# Patient Record
Sex: FEMALE | Race: WHITE | URBAN - METROPOLITAN AREA
[De-identification: names, ages, dates, MRNs, and addresses within clinical notes are randomized per-mention and may not be internally consistent; named-entity substitution may affect disease eponyms.]

---

## 2017-04-25 ENCOUNTER — TELEPHONE (OUTPATIENT)
Dept: OPTOMETRY | Facility: CLINIC | Age: 32
End: 2017-04-25

## 2017-04-25 ENCOUNTER — OFFICE VISIT (OUTPATIENT)
Dept: OPTOMETRY | Facility: CLINIC | Age: 32
End: 2017-04-25
Payer: COMMERCIAL

## 2017-04-25 DIAGNOSIS — Z98.890 STATUS POST LASIK SURGERY: Primary | ICD-10-CM

## 2017-04-25 DIAGNOSIS — H52.203 ASTIGMATISM OF BOTH EYES: ICD-10-CM

## 2017-04-25 DIAGNOSIS — H04.123 DRY EYES: ICD-10-CM

## 2017-04-25 DIAGNOSIS — H02.88B MEIBOMIAN GLAND DYSFUNCTION (MGD) OF UPPER AND LOWER LIDS OF BOTH EYES: ICD-10-CM

## 2017-04-25 DIAGNOSIS — H02.88A MEIBOMIAN GLAND DYSFUNCTION (MGD) OF UPPER AND LOWER LIDS OF BOTH EYES: ICD-10-CM

## 2017-04-25 PROCEDURE — 92015 DETERMINE REFRACTIVE STATE: CPT | Performed by: OPTOMETRIST

## 2017-04-25 PROCEDURE — 92004 COMPRE OPH EXAM NEW PT 1/>: CPT | Performed by: OPTOMETRIST

## 2017-04-25 ASSESSMENT — REFRACTION_MANIFEST
OD_AXIS: 019
OD_AXIS: 010
OD_CYLINDER: +0.50
OS_SPHERE: -0.25
OD_CYLINDER: +0.50
OS_CYLINDER: +0.00
OS_SPHERE: +0.00
METHOD_AUTOREFRACTION: 1
OD_SPHERE: -0.50
OD_SPHERE: -0.25
OS_CYLINDER: SPHERE
OS_AXIS: 00

## 2017-04-25 ASSESSMENT — EXTERNAL EXAM - RIGHT EYE: OD_EXAM: NORMAL

## 2017-04-25 ASSESSMENT — KERATOMETRY
OD_K1POWER_DIOPTERS: 42.00
METHOD_AUTO_MANUAL: AUTOMATED
OD_AXISANGLE_DEGREES: 81
OD_AXISANGLE2_DEGREES: 171
OS_AXISANGLE2_DEGREES: 5
OS_K2POWER_DIOPTERS: 42.87
OD_K2POWER_DIOPTERS: 43.37
OS_AXISANGLE_DEGREES: 95
OS_K1POWER_DIOPTERS: 41.62

## 2017-04-25 ASSESSMENT — CUP TO DISC RATIO
OD_RATIO: 0.15
OS_RATIO: 0.15

## 2017-04-25 ASSESSMENT — TONOMETRY
OS_IOP_MMHG: 15
OD_IOP_MMHG: 15
IOP_METHOD: APPLANATION

## 2017-04-25 ASSESSMENT — CONF VISUAL FIELD
OS_NORMAL: 1
OD_NORMAL: 1

## 2017-04-25 ASSESSMENT — EXTERNAL EXAM - LEFT EYE: OS_EXAM: NORMAL

## 2017-04-25 ASSESSMENT — SLIT LAMP EXAM - LIDS: COMMENTS: MEIBOMIAN GLAND DYSFUNCTION

## 2017-04-25 ASSESSMENT — VISUAL ACUITY
METHOD: SNELLEN - LINEAR
OS_SC: 20/20
OD_SC: 20/20
OS_SC: 20/20
OD_SC: 20/20

## 2017-04-25 NOTE — MR AVS SNAPSHOT
After Visit Summary   4/25/2017    Ivett Sauer    MRN: [id number]           Patient Information     Date Of Birth          1985        Visit Information        Provider Department      4/25/2017 1:00 PM Pam Weinstein OD [facility]        Today's Diagnoses     Status post LASIK surgery    -  1    Dry eyes        Meibomian gland dysfunction (MGD) of upper and lower lids of both eyes        Astigmatism of both eyes          Care Instructions     DRY EYE TREATMENT    I recommend using artificial tears for your dry eye. There are over the counter drops that work well and may be used up to 4x daily. ( systane balance, refresh optive, soothe xp) If you need more than 4 drops daily, use a preservative free product which come in individual vials which may be used for 24 hours and discarded.     Artificial tears work best as a preventative and not as well after your eyes are starting to bother you.  It may take 4- 6 weeks of using the drops before you notice improvement.  If after that time you are still having problems schedule an appointment for an evaluation and discussion of different treatments.  Dry eyes are a chronic condition and you may have more symptoms at certain times of the year.  IF the dry eye becomes more symptomatic, return to clinic and I will start you on restasis drops to help you produce more tears     In addition:  You have some plugged oil glands in your lids that are contributory     Warm compresses once to twice daily for 5-10 minutes will keep these glands working     Omega 3 fatty acid supplements 1-2x daily will also help with inflammation          Follow-ups after your visit        Follow-up notes from your care team     Return in about 1 year (around 4/25/2018).      Who to contact     If you have questions or need follow up information about today's clinic visit or your schedule please contact [facility] directly at [phone].  Normal  "or non-critical lab and imaging results will be communicated to you by MyChart, letter or phone within 4 business days after the clinic has received the results. If you do not hear from us within 7 days, please contact the clinic through [employer] or phone. If you have a critical or abnormal lab result, we will notify you by phone as soon as possible.  Submit refill requests through [employer] or call your pharmacy and they will forward the refill request to us. Please allow 3 business days for your refill to be completed.          Additional Information About Your Visit        [employer] Information     [employer] lets you send messages to your doctor, view your test results, renew your prescriptions, schedule appointments and more. To sign up, go to [email or web address] . Click on \"Log in\" on the left side of the screen, which will take you to the Welcome page. Then click on \"Sign up Now\" on the right side of the page.     You will be asked to enter the access code listed below, as well as some personal information. Please follow the directions to create your username and password.     Your access code is: [email or web address]  Expires: 2017  1:56 PM     Your access code will  in 90 days. If you need help or a new code, please call your [facility] clinic or [phone].        Care EveryWhere ID     This is your Care EveryWhere ID. This could be used by other organizations to access your [facility] medical records  [id number]         Blood Pressure from Last 3 Encounters:   No data found for BP    Weight from Last 3 Encounters:   No data found for Wt              We Performed the Following     EYE EXAM (SIMPLE-NONBILLABLE)     REFRACTION        Primary Care Provider    None       No address on file        Thank you!     Thank you for choosing [facility]  for your care. Our goal is always to provide you with excellent care. Hearing back from our patients is one way we can continue to improve our services. " Please take a few minutes to complete the written survey that you may receive in the mail after your visit with us. Thank you!             Your Updated Medication List - Protect others around you: Learn how to safely use, store and throw away your medicines at www.disposemymeds.org.      Notice  As of 4/25/2017  1:56 PM    You have not been prescribed any medications.

## 2017-04-25 NOTE — PATIENT INSTRUCTIONS
DRY EYE TREATMENT    I recommend using artificial tears for your dry eye. There are over the counter drops that work well and may be used up to 4x daily. ( systane balance, refresh optive, soothe xp) If you need more than 4 drops daily, use a preservative free product which come in individual vials which may be used for 24 hours and discarded.     Artificial tears work best as a preventative and not as well after your eyes are starting to bother you.  It may take 4- 6 weeks of using the drops before you notice improvement.  If after that time you are still having problems schedule an appointment for an evaluation and discussion of different treatments.  Dry eyes are a chronic condition and you may have more symptoms at certain times of the year.  IF the dry eye becomes more symptomatic, return to clinic and I will start you on restasis drops to help you produce more tears     In addition:  You have some plugged oil glands in your lids that are contributory     Warm compresses once to twice daily for 5-10 minutes will keep these glands working     Omega 3 fatty acid supplements 1-2x daily will also help with inflammation

## 2017-04-25 NOTE — PROGRESS NOTES
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  does not use glasses or contacts       Distance Vision Acuity: Satisfied with vision    Near Vision Acuity: Satisfied with vision while reading  unaided    Eye Comfort: dry  Do you use eye drops? : Yes: OTC Rewetting Drops  Occupation or Hobbies:Works in [employer], reading, walking              Medical, surgical and family histories reviewed and updated 4/25/2017.       OBJECTIVE: See Ophthalmology exam    ASSESSMENT:    ICD-10-CM    1. Status post LASIK surgery Z98.890 EYE EXAM (SIMPLE-NONBILLABLE)     REFRACTION   2. Dry eyes H04.123    3. Meibomian gland dysfunction (MGD) of upper and lower lids of both eyes H02.89     H01.002     H01.004     H01.005    4. Astigmatism of both eyes H52.203 EYE EXAM (SIMPLE-NONBILLABLE)     REFRACTION    negligible RE    PLAN:   Warm compresses / artificial tears    Fax exam notes to [facility]     Pam Weinstein OD

## 2017-12-04 ENCOUNTER — OFFICE VISIT (OUTPATIENT)
Dept: URGENT CARE | Facility: URGENT CARE | Age: 32
End: 2017-12-04
Payer: COMMERCIAL

## 2017-12-04 VITALS
WEIGHT: 122.7 LBS | TEMPERATURE: 98.5 F | SYSTOLIC BLOOD PRESSURE: 100 MMHG | OXYGEN SATURATION: 98 % | HEART RATE: 88 BPM | DIASTOLIC BLOOD PRESSURE: 60 MMHG

## 2017-12-04 DIAGNOSIS — J01.00 ACUTE MAXILLARY SINUSITIS, RECURRENCE NOT SPECIFIED: Primary | ICD-10-CM

## 2017-12-04 PROCEDURE — 99213 OFFICE O/P EST LOW 20 MIN: CPT | Performed by: FAMILY MEDICINE

## 2017-12-04 RX ORDER — CEFDINIR 300 MG/1
300 CAPSULE ORAL 2 TIMES DAILY
Qty: 20 CAPSULE | Refills: 0 | Status: SHIPPED | OUTPATIENT
Start: 2017-12-04

## 2017-12-04 NOTE — MR AVS SNAPSHOT
"              After Visit Summary   2017    Ivett Sauer    MRN: [id number]           Patient Information     Date Of Birth          1985        Visit Information        Provider Department      2017 11:50 AM Mukul Romero MD [facility] [place]        Today's Diagnoses     Acute maxillary sinusitis, recurrence not specified    -  1       Follow-ups after your visit        Who to contact     If you have questions or need follow up information about today's clinic visit or your schedule please contact [facility] directly at [phone].  Normal or non-critical lab and imaging results will be communicated to you by [employer], letter or phone within 4 business days after the clinic has received the results. If you do not hear from us within 7 days, please contact the clinic through [employer] or phone. If you have a critical or abnormal lab result, we will notify you by phone as soon as possible.  Submit refill requests through [employer] or call your pharmacy and they will forward the refill request to us. Please allow 3 business days for your refill to be completed.          Additional Information About Your Visit        MyChart Information     [employer] lets you send messages to your doctor, view your test results, renew your prescriptions, schedule appointments and more. To sign up, go to [email or web address] . Click on \"Log in\" on the left side of the screen, which will take you to the Welcome page. Then click on \"Sign up Now\" on the right side of the page.     You will be asked to enter the access code listed below, as well as some personal information. Please follow the directions to create your username and password.     Your access code is: [email or web address]  Expires: 3/9/2018 12:58 PM     Your access code will  in 90 days. If you need help or a new code, please call your [facility] clinic or [phone].        Care EveryWhere ID     This is your Care EveryWhere ID. This " could be used by other organizations to access your [facility] medical records  [id number]        Your Vitals Were     Pulse Temperature Pulse Oximetry             88 98.5  F (36.9  C) (Oral) 98%          Blood Pressure from Last 3 Encounters:   12/04/17 100/60    Weight from Last 3 Encounters:   12/04/17 122 lb 11.2 oz (55.7 kg)              Today, you had the following     No orders found for display         Today's Medication Changes          These changes are accurate as of: 12/4/17 11:59 PM.  If you have any questions, ask your nurse or doctor.               Start taking these medicines.        Dose/Directions    cefdinir 300 MG capsule   Commonly known as:  OMNICEF   Used for:  Acute maxillary sinusitis, recurrence not specified   Started by:  Mukul Romero MD        Dose:  300 mg   Take 1 capsule (300 mg) by mouth 2 times daily   Quantity:  20 capsule   Refills:  0            Where to get your medicines      These medications were sent to [facility] [place] [facility] [place] - [address]   [address]  [address], [place] [address]     Phone:  [phone]     cefdinir 300 MG capsule                Primary Care Provider Office Phone # Fax #    [facility] [phone]       [address]  [place] [address]        Equal Access to Services     MAYELIN CALI AH: Hadii aad ku hadasho Soomaali, waaxda luqadaha, qaybta kaalmada adeegyada, waxay tiffanyin hayleo torres. So [facility] [phone].    ATENCIÓN: Si habla español, tiene a wilson disposición servicios gratuitos de asistencia lingüística. Llopal al [phone].    We comply with applicable federal civil rights laws and [place] laws. We do not discriminate on the basis of race, color, national origin, age, disability, sex, sexual orientation, or gender identity.            Thank you!     Thank you for choosing [facility]  for your care. Our goal is always to provide you  with excellent care. Hearing back from our patients is one way we can continue to improve our services. Please take a few minutes to complete the written survey that you may receive in the mail after your visit with us. Thank you!             Your Updated Medication List - Protect others around you: Learn how to safely use, store and throw away your medicines at www.disposemymeds.org.          This list is accurate as of: 12/4/17 11:59 PM.  Always use your most recent med list.                   Brand Name Dispense Instructions for use Diagnosis    cefdinir 300 MG capsule    OMNICEF    20 capsule    Take 1 capsule (300 mg) by mouth 2 times daily    Acute maxillary sinusitis, recurrence not specified

## 2017-12-04 NOTE — LETTER
[facility]  [address]  [address]  [place] [address]  Phone: [phone]  Fax: [phone]    December 4, 2017        Ivett Sauer  [address]    [place] [address]          To whom it may concern:    RE: Ivett Sauer    Patient was seen and treated today at our clinic. May be unable to work secondary to infection.    Please contact me for questions or concerns.      Sincerely,        Mukul Romero MD

## 2017-12-04 NOTE — NURSING NOTE
Chief Complaint   Patient presents with     Urgent Care     Sinus Problem     a lot of mucus building with some swollen gland, fever, sinus pressure, fatigue x 1 month       Initial /60 (BP Location: Right arm, Patient Position: Chair, Cuff Size: Adult Regular)  Pulse 88  Temp 98.5  F (36.9  C) (Oral)  Wt 122 lb 11.2 oz (55.7 kg)  SpO2 98% There is no height or weight on file to calculate BMI.  Medication Reconciliation: unable or not appropriate to perform   Tiki Holbrook Medical Assistant

## 2017-12-04 NOTE — PROGRESS NOTES
SUBJECTIVE: Ivett Sauer is a 32 year old female patient complaining of sinus congestion for 21 days for 2 week(s).     OBJECTIVE: The patient appears alert and mild distress.   EARS: clear  NOSE/SINUS: Nares normal. Septum midline. Mucosa normal. No drainage or sinus tenderness., positive findings: mucosa erythematous and swollen  Sinus palpation: Frontal sinus nontender to palpation   THROAT: moderate erythema   NECK:positive findings: moderate anterior cervical nodes   CHEST: Clear    ASSESSMENT: Acute Sinusitis    PLAN: See orders.   In addition, I have suggested that the patient   Push fluids.